# Patient Record
Sex: FEMALE | Race: WHITE | Employment: FULL TIME | URBAN - NONMETROPOLITAN AREA
[De-identification: names, ages, dates, MRNs, and addresses within clinical notes are randomized per-mention and may not be internally consistent; named-entity substitution may affect disease eponyms.]

---

## 2017-10-18 ENCOUNTER — HOSPITAL ENCOUNTER (EMERGENCY)
Age: 42
Discharge: HOME | End: 2017-10-18
Payer: MEDICAID

## 2017-10-18 VITALS
SYSTOLIC BLOOD PRESSURE: 133 MMHG | OXYGEN SATURATION: 94 % | HEART RATE: 95 BPM | DIASTOLIC BLOOD PRESSURE: 82 MMHG | TEMPERATURE: 97.8 F | RESPIRATION RATE: 18 BRPM

## 2017-10-18 DIAGNOSIS — G54.2 CERVICAL NERVE ROOT IMPINGEMENT: Primary | ICD-10-CM

## 2017-10-18 PROCEDURE — 99282 EMERGENCY DEPT VISIT SF MDM: CPT

## 2017-10-18 PROCEDURE — 96372 THER/PROPH/DIAG INJ SC/IM: CPT

## 2017-10-18 PROCEDURE — 6360000002 HC RX W HCPCS: Performed by: STUDENT IN AN ORGANIZED HEALTH CARE EDUCATION/TRAINING PROGRAM

## 2017-10-18 RX ORDER — METHYLPREDNISOLONE 4 MG/1
TABLET ORAL
Qty: 1 KIT | Refills: 0 | Status: SHIPPED | OUTPATIENT
Start: 2017-10-18 | End: 2017-10-24

## 2017-10-18 RX ORDER — KETOROLAC TROMETHAMINE 30 MG/ML
30 INJECTION, SOLUTION INTRAMUSCULAR; INTRAVENOUS ONCE
Status: COMPLETED | OUTPATIENT
Start: 2017-10-18 | End: 2017-10-18

## 2017-10-18 RX ORDER — METHYLPREDNISOLONE SODIUM SUCCINATE 125 MG/2ML
125 INJECTION, POWDER, LYOPHILIZED, FOR SOLUTION INTRAMUSCULAR; INTRAVENOUS ONCE
Status: COMPLETED | OUTPATIENT
Start: 2017-10-18 | End: 2017-10-18

## 2017-10-18 RX ORDER — NAPROXEN 500 MG/1
500 TABLET ORAL 2 TIMES DAILY WITH MEALS
Qty: 30 TABLET | Refills: 0 | Status: SHIPPED | OUTPATIENT
Start: 2017-10-18 | End: 2021-03-10

## 2017-10-18 RX ADMIN — KETOROLAC TROMETHAMINE 30 MG: 30 INJECTION, SOLUTION INTRAMUSCULAR at 22:23

## 2017-10-18 RX ADMIN — METHYLPREDNISOLONE SODIUM SUCCINATE 125 MG: 125 INJECTION, POWDER, FOR SOLUTION INTRAMUSCULAR; INTRAVENOUS at 22:23

## 2017-10-18 ASSESSMENT — PAIN DESCRIPTION - LOCATION: LOCATION: ARM;NECK;HEAD

## 2017-10-18 ASSESSMENT — ENCOUNTER SYMPTOMS
NAUSEA: 0
DIARRHEA: 0
BACK PAIN: 0
ABDOMINAL PAIN: 0
WHEEZING: 0
EYE PAIN: 0
EYE DISCHARGE: 0
SORE THROAT: 0
SHORTNESS OF BREATH: 0
COUGH: 0
VOMITING: 0
RHINORRHEA: 0

## 2017-10-18 ASSESSMENT — PAIN DESCRIPTION - DESCRIPTORS: DESCRIPTORS: THROBBING;HEADACHE

## 2017-10-18 ASSESSMENT — PAIN DESCRIPTION - ORIENTATION: ORIENTATION: RIGHT

## 2017-10-18 ASSESSMENT — PAIN SCALES - GENERAL: PAINLEVEL_OUTOF10: 7

## 2017-10-18 ASSESSMENT — PAIN DESCRIPTION - PAIN TYPE: TYPE: ACUTE PAIN
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
## 2017-10-19 NOTE — ED PROVIDER NOTES
Solumedrol. I observed the patient's condition to remain stable during the duration of their stay. I explained my proposed course of treatment to the patient and their family, and they amenable to my decision. They were discharged home with Medrol (4 mg tablet) and Naproxen (500 mg tablet, instructed to take 2 times daily with meals) , and they will return to the ED if their symptoms become more severe in nature, or otherwise change. CRITICAL CARE:   None     CONSULTS:  None    PROCEDURES:  None    FINAL IMPRESSION      1. Cervical nerve root impingement          DISPOSITION/PLAN   Discharged    PATIENT REFERRED TO:  Neurologist  Please call your neurologist in the AM to request records and inform them of your worsening symptoms          DISCHARGE MEDICATIONS:  Discharge Medication List as of 10/18/2017 11:39 PM      START taking these medications    Details   methylPREDNISolone (MEDROL, ZAKI,) 4 MG tablet Take by mouth., Disp-1 kit, R-0Print      naproxen (NAPROSYN) 500 MG tablet Take 1 tablet by mouth 2 times daily (with meals) for 30 doses, Disp-30 tablet, R-0Print             (Please note that portions of this note were completed with a voice recognition program.  Efforts were made to edit the dictations but occasionally words are mis-transcribed.)    The patient was given an opportunity to see the Emergency Attending. The patient voiced understanding that I was a Mid-Level Provider and was in agreement with being seen independently by myself. Scribe:  Meng Serrano 10/18/17 10:34 PM Scribing for and in the presence of Radha Aaron PA-C. Signed by: Wallace Melara, 10/20/17 9:28 PM    Provider:  I personally performed the services described in the documentation, reviewed and edited the documentation which was dictated to the scribe in my presence, and it accurately records my words and actions.     Radha Aaron PA-C 10/18/17 9:28 PM\      Karlene Cruz PA-C  10/20/17 9893
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
## 2021-03-10 ENCOUNTER — HOSPITAL ENCOUNTER (EMERGENCY)
Age: 46
Discharge: HOME | End: 2021-03-11
Payer: MEDICAID

## 2021-03-10 DIAGNOSIS — F32.A ANXIETY AND DEPRESSION: Primary | ICD-10-CM

## 2021-03-10 DIAGNOSIS — F41.9 ANXIETY AND DEPRESSION: Primary | ICD-10-CM

## 2021-03-10 PROCEDURE — 80307 DRUG TEST PRSMV CHEM ANLYZR: CPT

## 2021-03-10 PROCEDURE — 82077 ASSAY SPEC XCP UR&BREATH IA: CPT

## 2021-03-10 PROCEDURE — 80179 DRUG ASSAY SALICYLATE: CPT

## 2021-03-10 PROCEDURE — 85025 COMPLETE CBC W/AUTO DIFF WBC: CPT

## 2021-03-10 PROCEDURE — 99283 EMERGENCY DEPT VISIT LOW MDM: CPT

## 2021-03-10 PROCEDURE — 36415 COLL VENOUS BLD VENIPUNCTURE: CPT

## 2021-03-10 PROCEDURE — 80048 BASIC METABOLIC PNL TOTAL CA: CPT

## 2021-03-10 PROCEDURE — 81001 URINALYSIS AUTO W/SCOPE: CPT

## 2021-03-10 PROCEDURE — 80143 DRUG ASSAY ACETAMINOPHEN: CPT

## 2021-03-10 RX ORDER — ALBUTEROL SULFATE 0.63 MG/3ML
1 SOLUTION RESPIRATORY (INHALATION) EVERY 6 HOURS PRN
COMMUNITY

## 2021-03-10 RX ORDER — PREDNISONE 20 MG/1
20 TABLET ORAL DAILY
COMMUNITY

## 2021-03-10 RX ORDER — ALBUTEROL SULFATE 90 UG/1
2 AEROSOL, METERED RESPIRATORY (INHALATION) EVERY 6 HOURS PRN
COMMUNITY

## 2021-03-10 ASSESSMENT — SLEEP AND FATIGUE QUESTIONNAIRES
DO YOU USE A SLEEP AID: NO
AVERAGE NUMBER OF SLEEP HOURS: 5

## 2021-03-10 ASSESSMENT — ENCOUNTER SYMPTOMS
COUGH: 0
VOMITING: 0
RHINORRHEA: 0
BACK PAIN: 0
CHEST TIGHTNESS: 0
SHORTNESS OF BREATH: 0
NAUSEA: 0

## 2021-03-10 ASSESSMENT — PATIENT HEALTH QUESTIONNAIRE - PHQ9: SUM OF ALL RESPONSES TO PHQ QUESTIONS 1-9: 13

## 2021-03-10 ASSESSMENT — PAIN DESCRIPTION - PAIN TYPE: TYPE: CHRONIC PAIN

## 2021-03-11 VITALS
HEIGHT: 68 IN | SYSTOLIC BLOOD PRESSURE: 121 MMHG | DIASTOLIC BLOOD PRESSURE: 71 MMHG | WEIGHT: 293 LBS | TEMPERATURE: 98.4 F | HEART RATE: 68 BPM | OXYGEN SATURATION: 96 % | RESPIRATION RATE: 18 BRPM | BODY MASS INDEX: 44.41 KG/M2

## 2021-03-11 LAB
ACETAMINOPHEN LEVEL: < 5 UG/ML (ref 0–20)
AMPHETAMINE+METHAMPHETAMINE URINE SCREEN: NEGATIVE
ANION GAP SERPL CALCULATED.3IONS-SCNC: 9 MEQ/L (ref 8–16)
BACTERIA: ABNORMAL /HPF
BARBITURATE QUANTITATIVE URINE: NEGATIVE
BASOPHILS # BLD: 0.2 %
BASOPHILS ABSOLUTE: 0 THOU/MM3 (ref 0–0.1)
BENZODIAZEPINE QUANTITATIVE URINE: NEGATIVE
BILIRUBIN URINE: NEGATIVE
BLOOD, URINE: NEGATIVE
BUN BLDV-MCNC: 12 MG/DL (ref 7–22)
CALCIUM SERPL-MCNC: 9.2 MG/DL (ref 8.5–10.5)
CANNABINOID QUANTITATIVE URINE: POSITIVE
CASTS 2: ABNORMAL /LPF
CASTS UA: ABNORMAL /LPF
CHARACTER, URINE: ABNORMAL
CHLORIDE BLD-SCNC: 101 MEQ/L (ref 98–111)
CO2: 26 MEQ/L (ref 23–33)
COCAINE METABOLITE QUANTITATIVE URINE: NEGATIVE
COLOR: YELLOW
CREAT SERPL-MCNC: 0.8 MG/DL (ref 0.4–1.2)
CRYSTALS, UA: ABNORMAL
EOSINOPHIL # BLD: 0.4 %
EOSINOPHILS ABSOLUTE: 0.1 THOU/MM3 (ref 0–0.4)
EPITHELIAL CELLS, UA: ABNORMAL /HPF
ERYTHROCYTE [DISTWIDTH] IN BLOOD BY AUTOMATED COUNT: 12.8 % (ref 11.5–14.5)
ERYTHROCYTE [DISTWIDTH] IN BLOOD BY AUTOMATED COUNT: 45.1 FL (ref 35–45)
ETHYL ALCOHOL, SERUM: < 0.01 %
GFR SERPL CREATININE-BSD FRML MDRD: 78 ML/MIN/1.73M2
GLUCOSE BLD-MCNC: 135 MG/DL (ref 70–108)
GLUCOSE URINE: NEGATIVE MG/DL
HCT VFR BLD CALC: 45.4 % (ref 37–47)
HEMOGLOBIN: 15 GM/DL (ref 12–16)
IMMATURE GRANS (ABS): 0.07 THOU/MM3 (ref 0–0.07)
IMMATURE GRANULOCYTES: 0.5 %
KETONES, URINE: ABNORMAL
LEUKOCYTE ESTERASE, URINE: NEGATIVE
LYMPHOCYTES # BLD: 9.4 %
LYMPHOCYTES ABSOLUTE: 1.2 THOU/MM3 (ref 1–4.8)
MCH RBC QN AUTO: 32 PG (ref 26–33)
MCHC RBC AUTO-ENTMCNC: 33 GM/DL (ref 32.2–35.5)
MCV RBC AUTO: 96.8 FL (ref 81–99)
MISCELLANEOUS 2: ABNORMAL
MONOCYTES # BLD: 4.5 %
MONOCYTES ABSOLUTE: 0.6 THOU/MM3 (ref 0.4–1.3)
MUCUS: ABNORMAL
NITRITE, URINE: NEGATIVE
NUCLEATED RED BLOOD CELLS: 0 /100 WBC
OPIATES, URINE: NEGATIVE
OSMOLALITY CALCULATION: 273.7 MOSMOL/KG (ref 275–300)
OXYCODONE: NEGATIVE
PH UA: 5.5 (ref 5–9)
PHENCYCLIDINE QUANTITATIVE URINE: NEGATIVE
PLATELET # BLD: 424 THOU/MM3 (ref 130–400)
PMV BLD AUTO: 9.8 FL (ref 9.4–12.4)
POTASSIUM SERPL-SCNC: 4.5 MEQ/L (ref 3.5–5.2)
PROTEIN UA: NEGATIVE
RBC # BLD: 4.69 MILL/MM3 (ref 4.2–5.4)
RBC URINE: ABNORMAL /HPF
RENAL EPITHELIAL, UA: ABNORMAL
SALICYLATE, SERUM: < 0.3 MG/DL (ref 2–10)
SEG NEUTROPHILS: 85 %
SEGMENTED NEUTROPHILS ABSOLUTE COUNT: 11 THOU/MM3 (ref 1.8–7.7)
SODIUM BLD-SCNC: 136 MEQ/L (ref 135–145)
SPECIFIC GRAVITY, URINE: > 1.03 (ref 1–1.03)
UROBILINOGEN, URINE: 1 EU/DL (ref 0–1)
WBC # BLD: 12.9 THOU/MM3 (ref 4.8–10.8)
WBC UA: ABNORMAL /HPF
YEAST: ABNORMAL

## 2021-03-11 RX ORDER — NICOTINE 21 MG/24HR
1 PATCH, TRANSDERMAL 24 HOURS TRANSDERMAL DAILY
Status: DISCONTINUED | OUTPATIENT
Start: 2021-03-11 | End: 2021-03-11

## 2021-03-11 RX ORDER — NICOTINE 21 MG/24HR
PATCH, TRANSDERMAL 24 HOURS TRANSDERMAL
Status: DISCONTINUED
Start: 2021-03-11 | End: 2021-03-11 | Stop reason: HOSPADM

## 2021-03-11 RX ORDER — NICOTINE 21 MG/24HR
1 PATCH, TRANSDERMAL 24 HOURS TRANSDERMAL ONCE
Status: DISCONTINUED | OUTPATIENT
Start: 2021-03-11 | End: 2021-03-11 | Stop reason: HOSPADM

## 2021-03-11 NOTE — ED NOTES
Social work at bedside to offer pt resources at this time     Shin Melchor, [address] [facility]  03/11/21 0146

## 2021-03-11 NOTE — ED PROVIDER NOTES
[facility] Emergency Department    CHIEF COMPLAINT       Chief Complaint   Patient presents with    Depression    Mental Health Problem       Nurses Notes reviewed and I agree except as noted in the HPI. HISTORY OF PRESENT ILLNESS    Angelica Domingo loan 40 y.o. female who presents to the ED for evaluation of depression and suicidal thoughts. She won't act on it because of family. She states that her doctor sent her here to be admitted. She states she has been depressed and overwhelmed. She is not getting out of bed. Is not functioning at all. She has not been on any medication for depression. She states she has severe anxiety. HPI was provided by the patient    REVIEW OF SYSTEMS     Review of Systems   Constitutional: Negative for chills, fatigue and fever. HENT: Negative for congestion, ear discharge, ear pain, postnasal drip and rhinorrhea. Respiratory: Negative for cough, chest tightness and shortness of breath. Cardiovascular: Negative for chest pain. Gastrointestinal: Negative for nausea and vomiting. Genitourinary: Negative for difficulty urinating, dysuria, enuresis, flank pain and hematuria. Musculoskeletal: Negative for back pain and joint swelling. Skin: Negative for rash. Neurological: Negative for dizziness, light-headedness, numbness and headaches. Psychiatric/Behavioral: Positive for dysphoric mood, sleep disturbance and suicidal ideas (with no plan/intent). Negative for agitation, behavioral problems and confusion. The patient is nervous/anxious. All other systems negative except as noted. PAST MEDICAL HISTORY     Past Medical History:   Diagnosis Date    Asthma     Bulging of cervical intervertebral disc     c3-c6       SURGICALHISTORY      has a past surgical history that includes Foot surgery (Right).     CURRENT MEDICATIONS       Discharge Medication List as of 3/11/2021  3:25 AM      CONTINUE these medications which have NOT CHANGED Details   predniSONE (DELTASONE) 20 MG tablet Take 20 mg by mouth dailyHistorical Med      albuterol (ACCUNEB) 0.63 MG/3ML nebulizer solution Take 1 ampule by nebulization every 6 hours as needed for WheezingHistorical Med      fluticasone-salmeterol (ADVAIR) 250-50 MCG/DOSE AEPB Inhale 1 puff into the lungs every 12 hoursHistorical Med      albuterol sulfate HFA (VENTOLIN HFA) 108 (90 Base) MCG/ACT inhaler Inhale 2 puffs into the lungs every 6 hours as needed for [address]     has No Known Allergies. FAMILY HISTORY     has no family status information on file. family history is not on file.     SOCIAL HISTORY       Social History     Socioeconomic History    Marital status:      Spouse name: Not on file    Number of children: Not on file    Years of education: Not on file    Highest education level: Not on file   Occupational History    Not on file   Social Needs    Financial resource strain: Not on file    Food insecurity     Worry: Not on file     Inability: Not on file    Transportation needs     Medical: Not on file     Non-medical: Not on file   Tobacco Use    Smoking status: Current Every Day Smoker     Packs/day: 0.50     Types: Cigarettes    Smokeless tobacco: Never Used   Substance and Sexual Activity    Alcohol use: Yes     Comment: rarely    Drug use: Not on file    Sexual activity: Not on file   Lifestyle    Physical activity     Days per week: Not on file     Minutes per session: Not on file    Stress: Not on file   Relationships    Social connections     Talks on phone: Not on file     Gets together: Not on file     Attends Oriental orthodox service: Not on file     Active member of club or organization: Not on file     Attends meetings of clubs or organizations: Not on file     Relationship status: Not on file    Intimate partner violence     Fear of current or ex partner: Not on file     Emotionally abused: Not on file     Physically abused: Not on file     Forced sexual activity: Not on file   Other Topics Concern    Not on file   Social History Narrative    Not on file       PHYSICAL EXAM     INITIAL VITALS:  height is 5' 8\" (1.727 m) and weight is 319 lb (144.7 kg) (abnormal). Her oral temperature is 98.4 °F (36.9 °C). Her blood pressure is 121/71 and her pulse is 68. Her respiration is 18 and oxygen saturation is 96%. Physical Exam  Vitals signs and nursing note reviewed. Constitutional:       General: She is not in acute distress. Appearance: She is well-developed. She is not diaphoretic. HENT:      Head: Normocephalic and atraumatic. Mouth/Throat:      Mouth: Mucous membranes are moist.      Pharynx: Oropharynx is clear. Eyes:      General:         Right eye: No discharge. Left eye: No discharge. Conjunctiva/sclera: Conjunctivae normal.   Neck:      Musculoskeletal: Normal range of motion. Trachea: No tracheal deviation. Cardiovascular:      Rate and Rhythm: Normal rate and regular rhythm. Heart sounds: Normal heart sounds. No murmur. No gallop. Comments: Normal capillary refill  Pulmonary:      Effort: Pulmonary effort is normal. No respiratory distress. Breath sounds: Normal breath sounds. No stridor. Abdominal:      General: Bowel sounds are normal.      Palpations: Abdomen is soft. Musculoskeletal: Normal range of motion. General: No tenderness or deformity. Skin:     General: Skin is warm and dry. Capillary Refill: Capillary refill takes less than 2 seconds. Coloration: Skin is not pale. Findings: No erythema or rash. Neurological:      General: No focal deficit present. Mental Status: She is alert and oriented to person, place, and time. Cranial Nerves: No cranial nerve deficit. Psychiatric:         Mood and Affect: Mood is anxious and depressed. Affect is flat. Behavior: Behavior is withdrawn. Thought Content:  Thought content 98.4 °F (36.9 °C)    TempSrc: Oral    SpO2: 97% 96%   Weight: (!) 319 lb (144.7 kg)    Height: 5' 8\" (1.727 m)           [facility]                  ED Course as of Mar 11 0459   Thu Mar 11, 2021   0323 Nurse notified me patient is accepted at [address] [place] [address]      ED Course User Index  [KJ] Kacy Stone, APRN - CNP         [facility]    The patient was seen and evaluated within the ED today for the evaluation of depression and anxiety. Physical exam revealed no significant abnormalities or concerns. I completed a medical evaluation of the patient and ordered appropriate labs which were unremarkable. JR and social work completed a full psychiatric evaluation of the patient and determined that he met discharge criteria. I medically cleared the patient. JR and social work's noted should be consulted for the psychiatric evaluation and reason for discharge. The patient was however, accepted at [place] [address]. Patient was transported there by cab for mental health treatment. Medications   nicotine (NICODERM CQ) 21 MG/24HR 1 patch (has no administration in time range)         Patient was seen independently by myself. The patient's final impression and disposition and plan was determined by myself. Strict return precautions and follow up instructions were discussed with the patient prior to discharge, with which the patient agrees. Physical assessment findings, diagnostic testing(s) if applicable, and vital signs reviewed with patient/patient representative. Questions answered. Medications asdirected, including OTC medications for supportive care. Education provided on medications. Differential diagnosis(s) discussed with patient/patient representative. Home care/self care instructions reviewed withpatient/patient representative. Patient is to follow-up with family care provider in 2-3 days if no improvement. Patient is to go to the emergency department if symptoms worsen.   Patient/patient representative isaware of care plan, questions answered, verbalizes understanding and is in agreement. CRITICAL CARE:   None    CONSULTS:  JR    PROCEDURES:  None    FINAL IMPRESSION     1. Anxiety and depression          DISPOSITION/PLAN   DISPOSITION Decision To Discharge 03/11/2021 03:51:31 AM      PATIENT REFERREDTO:  [facility]  [place], [place]  [phone]    Follow up with your current provider within two days. Return to any emergency room or call 911 if symptoms worsen. [facility]  [address] [facility] [address]  [phone]    Go to   For follow up      DISCHARGE MEDICATIONS:  Discharge Medication List as of 3/11/2021  3:25 AM          (Please note that portions of this note were completed with a voice recognition program.  Efforts were made to edit the dictations but occasionally words are mis-transcribed.)         SHAKILA Castañeda - SHAKILA Carson CNP  03/11/21 1839

## 2021-03-11 NOTE — PROGRESS NOTES
Patient reports concern with discharge and request other options. Jose Elias Barth CNP in agreement with referral to CSU. Information is provided to CSU  02:40 Patient is accepted to the [facility] 935.

## 2021-03-11 NOTE — ED NOTES
Pt resting quietly in room. VSS, respirations even and unlabored. Pt given oral hydration. Side rails up x2 with call light in reach. Will continue to monitor.        Spike Nunn RN  03/11/21 0637

## 2021-03-11 NOTE — ED TRIAGE NOTES
Pt presents to the ED via ED lobby with c/o depression. Pt arrives voluntarily after being instructed by her psychiatrist to come for evaluation and admission. Pt states her depression has progressively worsened to the point where she has a difficult time managing simple task in her life and doesn't want to get out of bed in the morning. Pt states she also struggles with ADD. Pt reports thoughts of wishing she could go to sleep and not wake up. Pt denies plan or intent on acting on these thoughts. Pt has family support that prevents her from wanting to act on her thoughts. VSS, respirations labored with ambulation. Pt tearful on arrival. Pt explained POC. Level A paged. Urine sample collected.

## 2021-03-11 NOTE — SUICIDE SAFETY PLAN
SAFETY PLAN    A suicide Safety Plan is a document that supports someone when they are having thoughts of suicide. Warning Signs that indicate a suicidal crisis may be developing: What (situations, thoughts, feelings, body sensations, behaviors, etc.) do you experience that lets you know you are beginning to think about suicide? 1. Crying, Feeling hopeless, feel like I can't get  Help,   2.   3. Internal Coping Strategies:  What things can I do (relaxation techniques, hobbies, physical activities, etc.) to take my mind off my problems without contacting another person? 1. 'I don't know that's why I came here;  2.  3.     People and social settings that provide distraction: Who can I call or where can I go to distract me? 1. Name:  Nothing/nobody. Phone:   3. Place:           4. Place:    People whom I can ask for help: Who can I call when I need help - for example, friends, family, clergy, someone else? 1. Name:    Brother           Phone:  2. Name:  Phone:   3. Name:  Phone:     Professionals or [address] [place] [address] I can contact during a crisis: Who can I call for help - for example, my doctor, my psychiatrist, my psychologist, a mental health provider, a suicide hotline? 1. Clinician Name:    Phone:       Clinician Pager or Emergency Contact #:     2. Clinician Name:  Phone:       Clinician Pager or Emergency Contact #:   3. Suicide Prevention Lifeline: 4-434-838-TALK (6294)    4. [address] [place] [address] Emergency Services -  for example, [facility] suicide hotline, [facility] Hotline:       Emergency Services Address:       Emergency Services Phone:     Making the environment safe: How can I make my environment (house/apartment/living space) safer? For example, can I remove guns, medications, and other items?   1.   2. 'Already is I guess'

## 2021-07-19 ENCOUNTER — NURSE TRIAGE (OUTPATIENT)
Dept: OTHER | Facility: CLINIC | Age: 46
End: 2021-07-19

## 2021-07-20 NOTE — TELEPHONE ENCOUNTER
Brief description of triage: a can fell on foot at [facility] about an hour ago, 2 previous surgeries on same foot, reports can bear weight however foot is painful with bruising and swelling and loss of sensation to the top of the great toe.  6/10 with no breaks in skin    Triage indicates for patient to Go to ED Now due to loss of sensation to toe. Care advice provided, patient verbalizes understanding; denies any other questions or concerns; instructed to call back for any new or worsening symptoms. This triage is a result of a call to Shayna alvarez Nurse. Please do not respond to the triage nurse through this encounter. Any subsequent communication should be directly with the patient. Reason for Disposition   [1] Numbness (new loss of sensation) of toe(s) AND [2] present now    Answer Assessment - Initial Assessment Questions  1. MECHANISM: \"How did the injury happen? \" (e.g., twisting injury, direct blow)       A can fell onto foot    2. ONSET: \"When did the injury happen? \" (Minutes or hours ago)       1 hour ago    3. LOCATION: \"Where is the injury located? \"       Right foot    4. APPEARANCE of INJURY: \"What does the injury look like? \"       Bruising and swelling    5. WEIGHT-BEARING: \"Can you put weight on that foot? \" \"Can you walk (four steps or more)? \"        Can walk but painful    6. SIZE: For cuts, bruises, or swelling, ask: \"How large is it? \" (e.g., inches or centimeters;  entire joint)       2in and puffed up    7. PAIN: \"Is there pain? \" If so, ask: \"How bad is the pain? \"    (e.g., Scale 1-10; or mild, moderate, severe)      6/10    8. TETANUS: For any breaks in the skin, ask: \"When was the last tetanus booster? \"      No    9. OTHER SYMPTOMS: \"Do you have any other symptoms? \"       No    10. PREGNANCY: \"Is there any chance you are pregnant? \" \"When was your last menstrual period? \"        N/A    Protocols used: ANKLE AND FOOT INJURY-ADULT-

## 2023-08-25 ENCOUNTER — NURSE TRIAGE (OUTPATIENT)
Dept: OTHER | Facility: CLINIC | Age: 48
End: 2023-08-25

## 2023-08-26 NOTE — TELEPHONE ENCOUNTER
Location of patient: [place]    Subjective: [place] [facility] [place] infection and kidney stone, pain has moved to other side and now with abd swelling and bloating, feels like may have passed suspected kidney stone, abd cramping on pain medication, reports nausea, no vomiting    Current Symptoms: abd cramping and bloat    Onset: a few hours ago; gradual    Associated Symptoms: NA    Pain Severity: unable to rate on pain scale,  cramping,  waxing and waning    Temperature: \"feels a little warrm\" subjective     What has been tried: abx from UTI diagnosis    Recommended disposition: See HCP within 4 Hours (or PCP triage)    Care advice provided, patient verbalizes understanding; denies any other questions or concerns; instructed to call back for any new or worsening symptoms. Patient/caller agrees to proceed to the Emergency Department    This triage is a result of a call to Shayna alvarez Nurse. Please do not respond to the triage nurse through this encounter. Any subsequent communication should be directly with the patient.     Reason for Disposition   [1] MODERATE-SEVERE SWELLING of abdomen (e.g., looks very distended or swollen) AND [2] NEW-onset or much worse    Protocols used: Abdomen Bloating and Swelling-ADULT- No

## 2024-03-25 RX ORDER — TRAMADOL HYDROCHLORIDE 50 MG/1
50 TABLET ORAL EVERY 6 HOURS PRN
Status: ON HOLD | COMMUNITY
End: 2024-03-28 | Stop reason: HOSPADM

## 2024-03-25 RX ORDER — EPINEPHRINE 0.3 MG/.3ML
0.3 INJECTION SUBCUTANEOUS PRN
COMMUNITY

## 2024-03-25 RX ORDER — BUDESONIDE AND FORMOTEROL FUMARATE DIHYDRATE 160; 4.5 UG/1; UG/1
2 AEROSOL RESPIRATORY (INHALATION) 2 TIMES DAILY
COMMUNITY

## 2024-03-25 RX ORDER — ARIPIPRAZOLE 10 MG/1
10 TABLET ORAL DAILY
COMMUNITY

## 2024-03-25 RX ORDER — ATORVASTATIN CALCIUM 40 MG/1
40 TABLET, FILM COATED ORAL DAILY
COMMUNITY

## 2024-03-25 RX ORDER — DULOXETIN HYDROCHLORIDE 60 MG/1
60 CAPSULE, DELAYED RELEASE ORAL DAILY
COMMUNITY

## 2024-03-25 RX ORDER — PANTOPRAZOLE SODIUM 40 MG/1
40 TABLET, DELAYED RELEASE ORAL DAILY
COMMUNITY

## 2024-03-25 NOTE — PROGRESS NOTES
cancelled if you don't have this)  Report to [facility] on 2nd floor  If you would become ill prior to surgery, please call the surgeon  May have a visitor with you, we request that you limit to 2 visitors in pre-op area  Masks are recommended but not required, new masks at entrance desk  Call [phone] for any questions

## 2024-03-25 NOTE — PROGRESS NOTES
Angelica called in to cancel her PAT Visit Appointment due to a migraine today. Surgery is this wed- in 2 days. We made an agreement for me to call her this afternoon to go over medications and instructions.

## 2024-03-25 NOTE — PROGRESS NOTES
Angelica stated she \"stopped all my medicatons before surgery\" .  I instructed patient to continue her symbicort Inhaler twice a day as usual and take it am of surgery. I instructed her to continue her cymbalta, abilify and protonix and she may take them  with sips of water am of surgery. And she also may take her tramadol as needed before surgery.  I instructed Angelica to call her  If this Migraine does not get better by tomorrow.

## 2024-03-26 ENCOUNTER — ANESTHESIA EVENT (OUTPATIENT)
Dept: OPERATING ROOM | Age: 49
End: 2024-03-26
Payer: COMMERCIAL

## 2024-03-27 ENCOUNTER — HOSPITAL ENCOUNTER (OUTPATIENT)
Age: 49
Discharge: HOME HEALTH | End: 2024-03-28
Attending: ORTHOPAEDIC SURGERY | Admitting: ORTHOPAEDIC SURGERY
Payer: COMMERCIAL

## 2024-03-27 ENCOUNTER — APPOINTMENT (OUTPATIENT)
Dept: GENERAL RADIOLOGY | Age: 49
End: 2024-03-27
Attending: ORTHOPAEDIC SURGERY
Payer: COMMERCIAL

## 2024-03-27 ENCOUNTER — APPOINTMENT (OUTPATIENT)
Dept: CT IMAGING | Age: 49
End: 2024-03-27
Attending: ORTHOPAEDIC SURGERY
Payer: COMMERCIAL

## 2024-03-27 ENCOUNTER — ANESTHESIA (OUTPATIENT)
Dept: OPERATING ROOM | Age: 49
End: 2024-03-27
Payer: COMMERCIAL

## 2024-03-27 DIAGNOSIS — Z98.1 S/P CERVICAL SPINAL FUSION: Primary | ICD-10-CM

## 2024-03-27 PROBLEM — M48.02 SPINAL STENOSIS OF CERVICAL REGION WITH RADICULOPATHY: Status: ACTIVE | Noted: 2024-03-27

## 2024-03-27 PROBLEM — M54.12 SPINAL STENOSIS OF CERVICAL REGION WITH RADICULOPATHY: Status: ACTIVE | Noted: 2024-03-27

## 2024-03-27 LAB
ABO: NORMAL
ANTIBODY SCREEN: NORMAL
PREGNANCY, URINE: NEGATIVE
RH FACTOR: NORMAL

## 2024-03-27 PROCEDURE — 72020 X-RAY EXAM OF SPINE 1 VIEW: CPT

## 2024-03-27 PROCEDURE — 6370000000 HC RX 637 (ALT 250 FOR IP): Performed by: PHYSICIAN ASSISTANT

## 2024-03-27 PROCEDURE — 6360000002 HC RX W HCPCS: Performed by: ANESTHESIOLOGY

## 2024-03-27 PROCEDURE — 94640 AIRWAY INHALATION TREATMENT: CPT

## 2024-03-27 PROCEDURE — 6360000002 HC RX W HCPCS: Performed by: PHYSICIAN ASSISTANT

## 2024-03-27 PROCEDURE — 2580000003 HC RX 258: Performed by: PHYSICIAN ASSISTANT

## 2024-03-27 PROCEDURE — 72125 CT NECK SPINE W/O DYE: CPT

## 2024-03-27 PROCEDURE — 2709999900 HC NON-CHARGEABLE SUPPLY: Performed by: ORTHOPAEDIC SURGERY

## 2024-03-27 PROCEDURE — 2500000003 HC RX 250 WO HCPCS: Performed by: ANESTHESIOLOGY

## 2024-03-27 PROCEDURE — 86900 BLOOD TYPING SEROLOGIC ABO: CPT

## 2024-03-27 PROCEDURE — 7100000000 HC PACU RECOVERY - FIRST 15 MIN: Performed by: ORTHOPAEDIC SURGERY

## 2024-03-27 PROCEDURE — 2580000003 HC RX 258: Performed by: ANESTHESIOLOGY

## 2024-03-27 PROCEDURE — 7100000010 HC PHASE II RECOVERY - FIRST 15 MIN: Performed by: ORTHOPAEDIC SURGERY

## 2024-03-27 PROCEDURE — 86850 RBC ANTIBODY SCREEN: CPT

## 2024-03-27 PROCEDURE — 7100000011 HC PHASE II RECOVERY - ADDTL 15 MIN: Performed by: ORTHOPAEDIC SURGERY

## 2024-03-27 PROCEDURE — 3600000004 HC SURGERY LEVEL 4 BASE: Performed by: ORTHOPAEDIC SURGERY

## 2024-03-27 PROCEDURE — 2720000010 HC SURG SUPPLY STERILE: Performed by: ORTHOPAEDIC SURGERY

## 2024-03-27 PROCEDURE — L0120 CERV FLEX N/ADJ FOAM PRE OTS: HCPCS | Performed by: ORTHOPAEDIC SURGERY

## 2024-03-27 PROCEDURE — 36415 COLL VENOUS BLD VENIPUNCTURE: CPT

## 2024-03-27 PROCEDURE — 7100000001 HC PACU RECOVERY - ADDTL 15 MIN: Performed by: ORTHOPAEDIC SURGERY

## 2024-03-27 PROCEDURE — 2500000003 HC RX 250 WO HCPCS: Performed by: ORTHOPAEDIC SURGERY

## 2024-03-27 PROCEDURE — C1889 IMPLANT/INSERT DEVICE, NOC: HCPCS | Performed by: ORTHOPAEDIC SURGERY

## 2024-03-27 PROCEDURE — 3700000001 HC ADD 15 MINUTES (ANESTHESIA): Performed by: ORTHOPAEDIC SURGERY

## 2024-03-27 PROCEDURE — 6360000002 HC RX W HCPCS

## 2024-03-27 PROCEDURE — C1713 ANCHOR/SCREW BN/BN,TIS/BN: HCPCS | Performed by: ORTHOPAEDIC SURGERY

## 2024-03-27 PROCEDURE — 3700000000 HC ANESTHESIA ATTENDED CARE: Performed by: ORTHOPAEDIC SURGERY

## 2024-03-27 PROCEDURE — 3600000014 HC SURGERY LEVEL 4 ADDTL 15MIN: Performed by: ORTHOPAEDIC SURGERY

## 2024-03-27 PROCEDURE — 81025 URINE PREGNANCY TEST: CPT

## 2024-03-27 PROCEDURE — 86901 BLOOD TYPING SEROLOGIC RH(D): CPT

## 2024-03-27 DEVICE — 4.0MM VARIABLE ANGLE SCREW, SELF-TAPPING, 14MM
Type: IMPLANTABLE DEVICE | Site: NECK | Status: FUNCTIONAL
Brand: ADMIRAL

## 2024-03-27 DEVICE — CERVICAL PLATE, 2 LEVEL, 32MM
Type: IMPLANTABLE DEVICE | Site: NECK | Status: FUNCTIONAL
Brand: ADMIRAL

## 2024-03-27 DEVICE — INTERBODY, 14X13X7MM, 7 DEGREE, 3D
Type: IMPLANTABLE DEVICE | Site: NECK | Status: FUNCTIONAL
Brand: 3D PRINTED INTERBODY SYSTEMS

## 2024-03-27 DEVICE — GRAFT BNE 2 CC DBM FIBREX: Type: IMPLANTABLE DEVICE | Site: NECK | Status: FUNCTIONAL

## 2024-03-27 DEVICE — GRAFT BNE MED: Type: IMPLANTABLE DEVICE | Site: NECK | Status: FUNCTIONAL

## 2024-03-27 RX ORDER — FENTANYL CITRATE 50 UG/ML
50 INJECTION, SOLUTION INTRAMUSCULAR; INTRAVENOUS EVERY 5 MIN PRN
Status: COMPLETED | OUTPATIENT
Start: 2024-03-27 | End: 2024-03-27

## 2024-03-27 RX ORDER — SODIUM CHLORIDE 0.9 % (FLUSH) 0.9 %
5-40 SYRINGE (ML) INJECTION EVERY 12 HOURS SCHEDULED
Status: DISCONTINUED | OUTPATIENT
Start: 2024-03-27 | End: 2024-03-27 | Stop reason: HOSPADM

## 2024-03-27 RX ORDER — FENTANYL CITRATE 50 UG/ML
INJECTION, SOLUTION INTRAMUSCULAR; INTRAVENOUS PRN
Status: DISCONTINUED | OUTPATIENT
Start: 2024-03-27 | End: 2024-03-27 | Stop reason: SDUPTHER

## 2024-03-27 RX ORDER — CYCLOBENZAPRINE HCL 10 MG
10 TABLET ORAL 3 TIMES DAILY PRN
Status: DISCONTINUED | OUTPATIENT
Start: 2024-03-27 | End: 2024-03-28 | Stop reason: HOSPADM

## 2024-03-27 RX ORDER — HYDROMORPHONE HYDROCHLORIDE 2 MG/ML
INJECTION, SOLUTION INTRAMUSCULAR; INTRAVENOUS; SUBCUTANEOUS PRN
Status: DISCONTINUED | OUTPATIENT
Start: 2024-03-27 | End: 2024-03-27 | Stop reason: SDUPTHER

## 2024-03-27 RX ORDER — ALBUTEROL SULFATE 90 UG/1
2 AEROSOL, METERED RESPIRATORY (INHALATION) EVERY 6 HOURS PRN
Status: DISCONTINUED | OUTPATIENT
Start: 2024-03-27 | End: 2024-03-28 | Stop reason: HOSPADM

## 2024-03-27 RX ORDER — HYDROXYZINE HYDROCHLORIDE 25 MG/1
25 TABLET, FILM COATED ORAL 3 TIMES DAILY PRN
COMMUNITY

## 2024-03-27 RX ORDER — IPRATROPIUM BROMIDE AND ALBUTEROL SULFATE 2.5; .5 MG/3ML; MG/3ML
SOLUTION RESPIRATORY (INHALATION)
Status: DISCONTINUED
Start: 2024-03-27 | End: 2024-03-27 | Stop reason: WASHOUT

## 2024-03-27 RX ORDER — ATORVASTATIN CALCIUM 40 MG/1
40 TABLET, FILM COATED ORAL DAILY
Status: DISCONTINUED | OUTPATIENT
Start: 2024-03-28 | End: 2024-03-28 | Stop reason: HOSPADM

## 2024-03-27 RX ORDER — EPHEDRINE SULFATE/0.9% NACL/PF 25 MG/5 ML
SYRINGE (ML) INTRAVENOUS PRN
Status: DISCONTINUED | OUTPATIENT
Start: 2024-03-27 | End: 2024-03-27 | Stop reason: SDUPTHER

## 2024-03-27 RX ORDER — POLYETHYLENE GLYCOL 3350 17 G/17G
17 POWDER, FOR SOLUTION ORAL DAILY
Status: DISCONTINUED | OUTPATIENT
Start: 2024-03-27 | End: 2024-03-28 | Stop reason: HOSPADM

## 2024-03-27 RX ORDER — SODIUM CHLORIDE 9 MG/ML
INJECTION, SOLUTION INTRAVENOUS CONTINUOUS PRN
Status: DISCONTINUED | OUTPATIENT
Start: 2024-03-27 | End: 2024-03-27 | Stop reason: SDUPTHER

## 2024-03-27 RX ORDER — PROPOFOL 10 MG/ML
INJECTION, EMULSION INTRAVENOUS PRN
Status: DISCONTINUED | OUTPATIENT
Start: 2024-03-27 | End: 2024-03-27 | Stop reason: SDUPTHER

## 2024-03-27 RX ORDER — DEXAMETHASONE SODIUM PHOSPHATE 4 MG/ML
8 INJECTION, SOLUTION INTRA-ARTICULAR; INTRALESIONAL; INTRAMUSCULAR; INTRAVENOUS; SOFT TISSUE EVERY 8 HOURS
Status: COMPLETED | OUTPATIENT
Start: 2024-03-27 | End: 2024-03-28

## 2024-03-27 RX ORDER — ONDANSETRON 2 MG/ML
INJECTION INTRAMUSCULAR; INTRAVENOUS PRN
Status: DISCONTINUED | OUTPATIENT
Start: 2024-03-27 | End: 2024-03-27 | Stop reason: SDUPTHER

## 2024-03-27 RX ORDER — MEPERIDINE HYDROCHLORIDE 25 MG/ML
12.5 INJECTION INTRAMUSCULAR; INTRAVENOUS; SUBCUTANEOUS EVERY 4 HOURS PRN
Status: DISCONTINUED | OUTPATIENT
Start: 2024-03-27 | End: 2024-03-27 | Stop reason: HOSPADM

## 2024-03-27 RX ORDER — BISACODYL 5 MG/1
5 TABLET, DELAYED RELEASE ORAL DAILY
Status: DISCONTINUED | OUTPATIENT
Start: 2024-03-27 | End: 2024-03-28 | Stop reason: HOSPADM

## 2024-03-27 RX ORDER — OXYCODONE HYDROCHLORIDE AND ACETAMINOPHEN 5; 325 MG/1; MG/1
1 TABLET ORAL EVERY 4 HOURS PRN
Status: DISCONTINUED | OUTPATIENT
Start: 2024-03-27 | End: 2024-03-28 | Stop reason: HOSPADM

## 2024-03-27 RX ORDER — DIPHENHYDRAMINE HYDROCHLORIDE 50 MG/ML
12.5 INJECTION INTRAMUSCULAR; INTRAVENOUS
Status: DISCONTINUED | OUTPATIENT
Start: 2024-03-27 | End: 2024-03-27 | Stop reason: HOSPADM

## 2024-03-27 RX ORDER — OXYCODONE HYDROCHLORIDE AND ACETAMINOPHEN 5; 325 MG/1; MG/1
2 TABLET ORAL EVERY 4 HOURS PRN
Status: DISCONTINUED | OUTPATIENT
Start: 2024-03-27 | End: 2024-03-28 | Stop reason: HOSPADM

## 2024-03-27 RX ORDER — SODIUM CHLORIDE 0.9 % (FLUSH) 0.9 %
5-40 SYRINGE (ML) INJECTION EVERY 12 HOURS SCHEDULED
Status: DISCONTINUED | OUTPATIENT
Start: 2024-03-27 | End: 2024-03-28 | Stop reason: HOSPADM

## 2024-03-27 RX ORDER — ARIPIPRAZOLE 10 MG/1
10 TABLET ORAL DAILY
Status: DISCONTINUED | OUTPATIENT
Start: 2024-03-27 | End: 2024-03-28 | Stop reason: HOSPADM

## 2024-03-27 RX ORDER — SODIUM CHLORIDE 0.9 % (FLUSH) 0.9 %
5-40 SYRINGE (ML) INJECTION PRN
Status: DISCONTINUED | OUTPATIENT
Start: 2024-03-27 | End: 2024-03-27 | Stop reason: HOSPADM

## 2024-03-27 RX ORDER — ONDANSETRON 2 MG/ML
4 INJECTION INTRAMUSCULAR; INTRAVENOUS EVERY 6 HOURS PRN
Status: DISCONTINUED | OUTPATIENT
Start: 2024-03-27 | End: 2024-03-28 | Stop reason: HOSPADM

## 2024-03-27 RX ORDER — ONDANSETRON 4 MG/1
4 TABLET, ORALLY DISINTEGRATING ORAL EVERY 8 HOURS PRN
Status: DISCONTINUED | OUTPATIENT
Start: 2024-03-27 | End: 2024-03-28 | Stop reason: HOSPADM

## 2024-03-27 RX ORDER — SODIUM CHLORIDE 9 MG/ML
INJECTION, SOLUTION INTRAVENOUS PRN
Status: DISCONTINUED | OUTPATIENT
Start: 2024-03-27 | End: 2024-03-28 | Stop reason: HOSPADM

## 2024-03-27 RX ORDER — METOCLOPRAMIDE HYDROCHLORIDE 5 MG/ML
10 INJECTION INTRAMUSCULAR; INTRAVENOUS
Status: DISCONTINUED | OUTPATIENT
Start: 2024-03-27 | End: 2024-03-27 | Stop reason: HOSPADM

## 2024-03-27 RX ORDER — NALOXONE HYDROCHLORIDE 0.4 MG/ML
INJECTION, SOLUTION INTRAMUSCULAR; INTRAVENOUS; SUBCUTANEOUS PRN
Status: DISCONTINUED | OUTPATIENT
Start: 2024-03-27 | End: 2024-03-27 | Stop reason: HOSPADM

## 2024-03-27 RX ORDER — DEXAMETHASONE SODIUM PHOSPHATE 10 MG/ML
INJECTION, EMULSION INTRAMUSCULAR; INTRAVENOUS PRN
Status: DISCONTINUED | OUTPATIENT
Start: 2024-03-27 | End: 2024-03-27 | Stop reason: SDUPTHER

## 2024-03-27 RX ORDER — FENTANYL CITRATE 50 UG/ML
25 INJECTION, SOLUTION INTRAMUSCULAR; INTRAVENOUS EVERY 5 MIN PRN
Status: DISCONTINUED | OUTPATIENT
Start: 2024-03-27 | End: 2024-03-27 | Stop reason: HOSPADM

## 2024-03-27 RX ORDER — FENTANYL CITRATE 50 UG/ML
INJECTION, SOLUTION INTRAMUSCULAR; INTRAVENOUS
Status: COMPLETED
Start: 2024-03-27 | End: 2024-03-27

## 2024-03-27 RX ORDER — SODIUM CHLORIDE 0.9 % (FLUSH) 0.9 %
5-40 SYRINGE (ML) INJECTION PRN
Status: DISCONTINUED | OUTPATIENT
Start: 2024-03-27 | End: 2024-03-28 | Stop reason: HOSPADM

## 2024-03-27 RX ORDER — SODIUM CHLORIDE 9 MG/ML
INJECTION, SOLUTION INTRAVENOUS CONTINUOUS
Status: DISCONTINUED | OUTPATIENT
Start: 2024-03-27 | End: 2024-03-28 | Stop reason: HOSPADM

## 2024-03-27 RX ORDER — SODIUM CHLORIDE 9 MG/ML
INJECTION, SOLUTION INTRAVENOUS CONTINUOUS
Status: DISCONTINUED | OUTPATIENT
Start: 2024-03-27 | End: 2024-03-27 | Stop reason: HOSPADM

## 2024-03-27 RX ORDER — LIDOCAINE HYDROCHLORIDE AND EPINEPHRINE 10; 10 MG/ML; UG/ML
INJECTION, SOLUTION INFILTRATION; PERINEURAL PRN
Status: DISCONTINUED | OUTPATIENT
Start: 2024-03-27 | End: 2024-03-27 | Stop reason: ALTCHOICE

## 2024-03-27 RX ORDER — ROCURONIUM BROMIDE 10 MG/ML
INJECTION, SOLUTION INTRAVENOUS PRN
Status: DISCONTINUED | OUTPATIENT
Start: 2024-03-27 | End: 2024-03-27 | Stop reason: SDUPTHER

## 2024-03-27 RX ORDER — DULOXETIN HYDROCHLORIDE 60 MG/1
60 CAPSULE, DELAYED RELEASE ORAL DAILY
Status: DISCONTINUED | OUTPATIENT
Start: 2024-03-27 | End: 2024-03-28 | Stop reason: HOSPADM

## 2024-03-27 RX ORDER — MIDAZOLAM HYDROCHLORIDE 1 MG/ML
INJECTION INTRAMUSCULAR; INTRAVENOUS PRN
Status: DISCONTINUED | OUTPATIENT
Start: 2024-03-27 | End: 2024-03-27 | Stop reason: SDUPTHER

## 2024-03-27 RX ORDER — LIDOCAINE HCL/PF 100 MG/5ML
SYRINGE (ML) INJECTION PRN
Status: DISCONTINUED | OUTPATIENT
Start: 2024-03-27 | End: 2024-03-27 | Stop reason: SDUPTHER

## 2024-03-27 RX ORDER — HYDROXYZINE HYDROCHLORIDE 25 MG/1
25 TABLET, FILM COATED ORAL 3 TIMES DAILY PRN
Status: DISCONTINUED | OUTPATIENT
Start: 2024-03-27 | End: 2024-03-28 | Stop reason: HOSPADM

## 2024-03-27 RX ORDER — SODIUM CHLORIDE 9 MG/ML
INJECTION, SOLUTION INTRAVENOUS PRN
Status: DISCONTINUED | OUTPATIENT
Start: 2024-03-27 | End: 2024-03-27 | Stop reason: HOSPADM

## 2024-03-27 RX ORDER — PANTOPRAZOLE SODIUM 40 MG/1
40 TABLET, DELAYED RELEASE ORAL
Status: DISCONTINUED | OUTPATIENT
Start: 2024-03-28 | End: 2024-03-28 | Stop reason: HOSPADM

## 2024-03-27 RX ADMIN — PROPOFOL 200 MG: 10 INJECTION, EMULSION INTRAVENOUS at 09:48

## 2024-03-27 RX ADMIN — ROCURONIUM BROMIDE 50 MG: 10 INJECTION INTRAVENOUS at 10:23

## 2024-03-27 RX ADMIN — OXYCODONE HYDROCHLORIDE AND ACETAMINOPHEN 2 TABLET: 5; 325 TABLET ORAL at 19:56

## 2024-03-27 RX ADMIN — CYCLOBENZAPRINE 10 MG: 10 TABLET, FILM COATED ORAL at 19:56

## 2024-03-27 RX ADMIN — ONDANSETRON 4 MG: 2 INJECTION INTRAMUSCULAR; INTRAVENOUS at 09:57

## 2024-03-27 RX ADMIN — OXYCODONE HYDROCHLORIDE AND ACETAMINOPHEN 2 TABLET: 5; 325 TABLET ORAL at 14:20

## 2024-03-27 RX ADMIN — SODIUM CHLORIDE: 9 INJECTION, SOLUTION INTRAVENOUS at 17:10

## 2024-03-27 RX ADMIN — FENTANYL CITRATE 100 MCG: 50 INJECTION, SOLUTION INTRAMUSCULAR; INTRAVENOUS at 09:48

## 2024-03-27 RX ADMIN — MIDAZOLAM 2 MG: 1 INJECTION INTRAMUSCULAR; INTRAVENOUS at 09:48

## 2024-03-27 RX ADMIN — HYDROMORPHONE HYDROCHLORIDE 0.5 MG: 1 INJECTION, SOLUTION INTRAMUSCULAR; INTRAVENOUS; SUBCUTANEOUS at 22:31

## 2024-03-27 RX ADMIN — SODIUM CHLORIDE: 9 INJECTION, SOLUTION INTRAVENOUS at 22:28

## 2024-03-27 RX ADMIN — Medication 3000 MG: at 18:50

## 2024-03-27 RX ADMIN — EPHEDRINE SULFATE 10 MG: 5 INJECTION INTRAVENOUS at 09:56

## 2024-03-27 RX ADMIN — FENTANYL CITRATE 50 MCG: 50 INJECTION INTRAMUSCULAR; INTRAVENOUS at 12:00

## 2024-03-27 RX ADMIN — CYCLOBENZAPRINE 10 MG: 10 TABLET, FILM COATED ORAL at 13:17

## 2024-03-27 RX ADMIN — ROCURONIUM BROMIDE 50 MG: 10 INJECTION INTRAVENOUS at 09:48

## 2024-03-27 RX ADMIN — DEXAMETHASONE SODIUM PHOSPHATE 8 MG: 4 INJECTION, SOLUTION INTRA-ARTICULAR; INTRALESIONAL; INTRAMUSCULAR; INTRAVENOUS; SOFT TISSUE at 17:16

## 2024-03-27 RX ADMIN — SODIUM CHLORIDE: 9 INJECTION, SOLUTION INTRAVENOUS at 07:12

## 2024-03-27 RX ADMIN — FENTANYL CITRATE 50 MCG: 50 INJECTION INTRAMUSCULAR; INTRAVENOUS at 12:30

## 2024-03-27 RX ADMIN — Medication 3000 MG: at 09:56

## 2024-03-27 RX ADMIN — FENTANYL CITRATE 50 MCG: 50 INJECTION INTRAMUSCULAR; INTRAVENOUS at 12:40

## 2024-03-27 RX ADMIN — DEXAMETHASONE SODIUM PHOSPHATE 5 MG: 10 INJECTION, EMULSION INTRAMUSCULAR; INTRAVENOUS at 09:57

## 2024-03-27 RX ADMIN — Medication 100 MG: at 09:48

## 2024-03-27 RX ADMIN — HYDROMORPHONE HYDROCHLORIDE 0.25 MG: 1 INJECTION, SOLUTION INTRAMUSCULAR; INTRAVENOUS; SUBCUTANEOUS at 08:35

## 2024-03-27 RX ADMIN — SODIUM CHLORIDE: 9 INJECTION, SOLUTION INTRAVENOUS at 09:36

## 2024-03-27 RX ADMIN — HYDROMORPHONE HYDROCHLORIDE 0.5 MG: 2 INJECTION INTRAMUSCULAR; INTRAVENOUS; SUBCUTANEOUS at 11:01

## 2024-03-27 RX ADMIN — FENTANYL CITRATE 50 MCG: 50 INJECTION INTRAMUSCULAR; INTRAVENOUS at 12:10

## 2024-03-27 RX ADMIN — HYDROMORPHONE HYDROCHLORIDE 0.25 MG: 1 INJECTION, SOLUTION INTRAMUSCULAR; INTRAVENOUS; SUBCUTANEOUS at 17:16

## 2024-03-27 RX ADMIN — MOMETASONE FUROATE AND FORMOTEROL FUMARATE DIHYDRATE 2 PUFF: 200; 5 AEROSOL RESPIRATORY (INHALATION) at 16:51

## 2024-03-27 RX ADMIN — HYDROMORPHONE HYDROCHLORIDE 0.5 MG: 2 INJECTION INTRAMUSCULAR; INTRAVENOUS; SUBCUTANEOUS at 10:32

## 2024-03-27 RX ADMIN — SUGAMMADEX 200 MG: 100 INJECTION, SOLUTION INTRAVENOUS at 11:31

## 2024-03-27 ASSESSMENT — PAIN DESCRIPTION - DESCRIPTORS
DESCRIPTORS: PRESSURE
DESCRIPTORS: PRESSURE;TIGHTNESS;SHARP
DESCRIPTORS: ACHING
DESCRIPTORS: SHARP
DESCRIPTORS: ACHING
DESCRIPTORS: ACHING
DESCRIPTORS: ACHING;TIGHTNESS;PRESSURE
DESCRIPTORS: ACHING
DESCRIPTORS: STABBING;ACHING

## 2024-03-27 ASSESSMENT — PAIN DESCRIPTION - LOCATION
LOCATION: NECK
LOCATION: NECK;BACK
LOCATION: NECK
LOCATION: BACK
LOCATION: NECK

## 2024-03-27 ASSESSMENT — PAIN SCALES - GENERAL
PAINLEVEL_OUTOF10: 6
PAINLEVEL_OUTOF10: 8
PAINLEVEL_OUTOF10: 6
PAINLEVEL_OUTOF10: 5
PAINLEVEL_OUTOF10: 6
PAINLEVEL_OUTOF10: 10
PAINLEVEL_OUTOF10: 7

## 2024-03-27 ASSESSMENT — PAIN DESCRIPTION - ORIENTATION
ORIENTATION: ANTERIOR
ORIENTATION: LEFT
ORIENTATION: LEFT
ORIENTATION: MID;LEFT
ORIENTATION: ANTERIOR
ORIENTATION: MID;LEFT
ORIENTATION: MID

## 2024-03-27 ASSESSMENT — PAIN - FUNCTIONAL ASSESSMENT
PAIN_FUNCTIONAL_ASSESSMENT: 0-10
PAIN_FUNCTIONAL_ASSESSMENT: PREVENTS OR INTERFERES SOME ACTIVE ACTIVITIES AND ADLS
PAIN_FUNCTIONAL_ASSESSMENT: 0-10
PAIN_FUNCTIONAL_ASSESSMENT: PREVENTS OR INTERFERES WITH MANY ACTIVE NOT PASSIVE ACTIVITIES
PAIN_FUNCTIONAL_ASSESSMENT: PREVENTS OR INTERFERES SOME ACTIVE ACTIVITIES AND ADLS
PAIN_FUNCTIONAL_ASSESSMENT: PREVENTS OR INTERFERES SOME ACTIVE ACTIVITIES AND ADLS
PAIN_FUNCTIONAL_ASSESSMENT: PREVENTS OR INTERFERES WITH MANY ACTIVE NOT PASSIVE ACTIVITIES

## 2024-03-27 ASSESSMENT — PAIN DESCRIPTION - PAIN TYPE
TYPE: SURGICAL PAIN

## 2024-03-27 ASSESSMENT — PAIN SCALES - WONG BAKER: WONGBAKER_NUMERICALRESPONSE: NO HURT

## 2024-03-27 NOTE — PROGRESS NOTES
Pt returned to [facility] room 2. Vitals and assessment as charted. 0.9 infusing, @600ml to count from PACU. Pt has pudding and water. Family at the bedside. Pt and family verbalized understanding of discharge criteria and call light use. Call light in reach.

## 2024-03-27 NOTE — ANESTHESIA POSTPROCEDURE EVALUATION
Department of Anesthesiology  Postprocedure Note    Patient: Angelica Jimenez  MRN: [id number]  YOB: 1975  Date of evaluation: 3/27/2024    Procedure Summary       Date: 03/27/24 Room / Location: [facility] OR  / [facility] OR    Anesthesia Start: 0936 Anesthesia Stop: 1147    Procedure: ACDF C4-C6 (Neck) Diagnosis:       Spinal stenosis in cervical region      (Spinal stenosis in cervical region [M48.02])    Surgeons: Clay Cunningham MD Responsible Provider: Ian Rangel MD    Anesthesia Type: general ASA Status: 3            Anesthesia Type: No value filed.    Narcisa Phase I: Narcisa Score: 10    Narcisa Phase II: Narcisa Score: 9    Anesthesia Post Evaluation    Patient location during evaluation: PACU  Patient participation: complete - patient participated  Level of consciousness: awake  Airway patency: patent  Nausea & Vomiting: no nausea  Cardiovascular status: hemodynamically stable  Respiratory status: acceptable  Hydration status: stable  Pain management: adequate    No notable events documented.

## 2024-03-27 NOTE — PROGRESS NOTES
Report called to Beatriz BOND on 7K. Pt to be transported to 7k14 per bed with personal belongings. No family at bedside at this time.

## 2024-03-27 NOTE — ANESTHESIA PRE PROCEDURE
Vascular:          Other Findings:       Anesthesia Plan      general     ASA 3       Induction: intravenous.    MIPS: Postoperative opioids intended and Prophylactic antiemetics administered.  Anesthetic plan and risks discussed with patient and child/children.      Plan discussed with CRNA.                JEFFY BAER DO   3/27/2024

## 2024-03-27 NOTE — PLAN OF CARE
Problem: Respiratory - Adult  Goal: Achieves optimal ventilation and oxygenation  Outcome: Progressing  Flowsheets (Taken 3/27/2024 8295)  Achieves optimal ventilation and oxygenation:   Assess for changes in respiratory status   Respiratory therapy support as indicated   Assess and instruct to report shortness of breath or any respiratory difficulty

## 2024-03-27 NOTE — H&P
[address] [address]                            PREOPERATIVE H&P      PATIENT NAME: SREEDHAR JOHNSON           : 1975  MED REC NO: [id number]                       ROOM: [facility] OR                                               (General) POOL                                               RM    ACCOUNT NO: [id number]                       ADMIT DATE: 2024  PROVIDER: Hebert Rojas PA-C      PRIMARY CARE PHYSICIAN:  Dr. Marquez    This is a patient DrBrooks Cunningham, who will be undergoing surgical intervention at [facility] on 2024, where she will undergo an ACDF of C4-C6 with allograft bone implanting.    CHIEF COMPLAINT:  Cervical pain, bilateral shoulder pain, and predominant left upper extremity radiculopathy.    HISTORY OF PRESENT ILLNESS:  The patient is a 49-year-old female, who presented to our office today with episodes of cervical pain that radiates to her bilateral shoulders and predominantly left upper extremity radiculopathy radiating to her arms, radial forearm and thumb.  She also demonstrated significant weakness of her left upper extremity, in which she describes her symptoms as pinching feeling.  She failed to improve despite her conservative treatment including physical therapy, over-the-counter NSAIDs, and activity modifications.  She was worked up with an MRI of her cervical spine, which did demonstrate significant nerve compression at C4-C5 and C5-C6.  With her failed conservative treatment, she has elected to proceed with surgical intervention and was surgically cleared by her PCP, Dr. Marquez; and cardiologist, Dr. Blanco, to undergo the surgery.    ALLERGIES:  NO KNOWN DRUG ALLERGIES.      PAST MEDICAL HISTORY:  Significant for GERD, COPD, hypertension.    CURRENT MEDICATIONS:  Include albuterol sulfate, Ventolin inhaler, atorvastatin, Symbicort, metoprolol, Abilify, ibuprofen which

## 2024-03-27 NOTE — OP NOTE
[address] [address]                            OPERATIVE REPORT      PATIENT NAME: SREEDHAR JOHNSON           : 1975  MED REC NO: [id number]                       ROOM: [facility] OR                                               (General) POOL                                               RM    ACCOUNT NO: [id number]                       ADMIT DATE: 2024  PROVIDER: Clay Cunningham MD      DATE OF PROCEDURE:  2024    SURGEON:  Clay Cunningham MD    PREOPERATIVE DIAGNOSES:    1. Cervical disk herniation at levels of C4-C5 and C5-C6.  2. Left upper radiculopathy.  3. Body mass index of 50.69 as the patient is 5 feet 8 inches in height and 151 kg in weight.    POSTOPERATIVE DIAGNOSES:    1. Cervical disk herniation at levels of C4-C5 and C5-C6.  2. Left upper extremity radiculopathy.  3. Body mass index of 50.69 as the patient is 5 feet 8 inches in height and 151 kg in weight.    PROCEDURES:    1. Anterior cervical diskectomy of levels of C4-C5 and C5-C6 with a complete uncus decompression and clearance of canal for each of the levels of surgery.  2. Anterior cervical interbody fusion at levels of C4-C5 and C5-C6.  3. Use of the mySugrpine WaveForm interbody cervical cage at each level of C4-C5 and C5-C6.  Each of these 2 cages would measure 7 x 14 x 13 mm of dimension, each of them with 7 degrees of lordosis for these 2 levels of  interbody fusion.  4. Use of allograft bone graft DBM by the Xtant FiberX 2 mL mixed with 2.5 mL of the OsteoFactor, bone growth factor mixed for the fusion and placed within each interbody cage levels of C4-C5 and C5-C6 for fusion.  5. Plating of cervical spine with a 32 mm C-spine Admiral plate attached to bone total of 6 screws each measuring 14 mm in length, attached to bone to the plate and each locking mechanism engaged.  6. Increase in the operative time over 50% more than usual because of her  could not see below this at the C5-C6.    We would finish by placing a MAURILIO drain, closing in layers and then applying an Aspen collar and then would awaken the patient to take her back to Recovery postextubation.    My first assistant has been Hebert Rojas PA-C, throughout the operation.    We planned to obtain a CT scan today because of the body habitus which obscures the view on x-ray.    Due to her body habitus and BMI of 50.69, we also found the technique and duration of this operation and also the last over 50% more than usual because of body habitus which includes all aspects of positioning, surgical retraction, technical aspects of the decompression and fusion and wound closure.          EZE VÁSQUEZ MD      D:  03/27/2024 11:34:27     T:  03/27/2024 14:29:05     KINA/SOUMYA  Job #:  [id number]     Doc#:  [id number]

## 2024-03-27 NOTE — H&P
I have reviewed the history and physical and examined the patient.  I find no relevant changes.  I have reviewed with the patient and/or family members, during the preoperative office visit the risks, benefits, and alternatives to the procedure.    Clay Cunningham MD

## 2024-03-27 NOTE — RT PROTOCOL NOTE
of breathing using Per Protocol order mode.        4-6 - enter or revise RT Bronchodilator order(s) to two equivalent RT bronchodilator orders with one order with BID Frequency and one order with Frequency of every 4 hours PRN wheezing or increased work of breathing using Per Protocol order mode.        7-10 - enter or revise RT Bronchodilator order(s) to two equivalent RT bronchodilator orders with one order with TID Frequency and one order with Frequency of every 4 hours PRN wheezing or increased work of breathing using Per Protocol order mode.       11-13 - enter or revise RT Bronchodilator order(s) to one equivalent RT bronchodilator order with QID Frequency and an Albuterol order with Frequency of every 4 hours PRN wheezing or increased work of breathing using Per Protocol order mode.      Greater than 13 - enter or revise RT Bronchodilator order(s) to one equivalent RT bronchodilator order with every 4 hours Frequency and an Albuterol order with Frequency of every 2 hours PRN wheezing or increased work of breathing using Per Protocol order mode.     RT to enter RT Home Evaluation for COPD & MDI Assessment order using Per Protocol order mode.    Electronically signed by Alexandra Tobar RCP on 3/27/2024 at 5:34 PM

## 2024-03-27 NOTE — BRIEF OP NOTE
Brief Postoperative Note      Patient: Angelica Jimenez  YOB: 1975  MRN: [id number]    Date of Procedure: 3/27/2024    Pre-Op Diagnosis Codes:     * Spinal stenosis in cervical region [M48.02]    Post-Op Diagnosis: Same       Procedure(s):  ACDF C4-C6    Surgeon(s):  Clay Cunningham MD    Assistant:  Hebert TIRADO    Anesthesia: General    Estimated Blood Loss (mL): less than 50     Complications: None    Specimens:   * No specimens in log *    Implants:  Implant Name Type Inv. Item Serial No.  Lot No. LRB No. Used Action   GRAFT BNE 2 CC DBM FIBREX - [id number]  GRAFT BNE 2 CC DBM FIBREX  [employer]  N/A 1 Implanted   CAGE SPNL 7 DEG [id number] MM 3D - [id number]  CAGE SPNL 7 DEG [id number] MM 3D  [employer] [id number] N/A 2 Implanted   GRAFT BNE MED - [id number]  GRAFT BNE MED [id number] BIOLOGICA  N/A 1 Implanted   SCREW SPNL ST 4X14 MM VA [employer]MIRAL [id number] - [id number]  SCREW SPNL ST 4X14 MM [employer] ADMIRAL [id number]  [employer]  N/A 6 Implanted         Drains: * No LDAs found *    Findings: same      Electronically signed by Clay Cunningham MD on 3/27/2024 at 11:24 AM

## 2024-03-27 NOTE — PROGRESS NOTES
.1145  pt. Awake and oriented on  arrival to pacu.  Pt. Anxious.  Pt. Upset with VISTA collar and attempting to take it off.  No deficits noted in extremities x 4.  1200  pt. Medicated for pain 10 out of 10.  1230  pt. Continues to be anxious and complains of pain 10 out of 10.  Continue to medicate for pain.  1300  pt. States she feels better.  Pt. Drinking water without difficulty.  1317  pt. Medicated for muscle relaxant.

## 2024-03-27 NOTE — PLAN OF CARE
Problem: Discharge Planning  Goal: Discharge to home or other facility with appropriate resources  3/27/2024 1947 by Cait Watson RN  Outcome: Progressing  3/27/2024 1809 by Beatriz Hackett RN  Flowsheets  Taken 3/27/2024 1809 by Beatriz Hackett RN  Discharge to home or other facility with appropriate resources:   Identify barriers to discharge with patient and caregiver   Identify discharge learning needs (meds, wound care, etc)   Arrange for needed discharge resources and transportation as appropriate  Taken 3/27/2024 1430 by Dona Luong RN  Discharge to home or other facility with appropriate resources: Identify discharge learning needs (meds, wound care, etc)     Problem: Pain  Goal: Verbalizes/displays adequate comfort level or baseline comfort level  3/27/2024 1947 by Cait Watson RN  Outcome: Progressing  3/27/2024 1809 by Beatriz Hackett RN  Outcome: Progressing  Flowsheets (Taken 3/27/2024 1809)  Verbalizes/displays adequate comfort level or baseline comfort level:   Encourage patient to monitor pain and request assistance   Administer analgesics based on type and severity of pain and evaluate response   Implement non-pharmacological measures as appropriate and evaluate response     Problem: Respiratory - Adult  Goal: Achieves optimal ventilation and oxygenation  3/27/2024 1947 by Cait Watson RN  Outcome: Progressing  3/27/2024 1654 by Alexandra Tobar RCP  Outcome: Progressing  Flowsheets (Taken 3/27/2024 1654)  Achieves optimal ventilation and oxygenation:   Assess for changes in respiratory status   Respiratory therapy support as indicated   Assess and instruct to report shortness of breath or any respiratory difficulty

## 2024-03-27 NOTE — PROGRESS NOTES
Pt admitted to [facility] room 2 and oriented to unit. SCD sleeves applied. Nares swabbed. Pt verbalized permission for first name, last initial and physicians name on white board. SDS board and discharge criteria explained, pt and family verbalized understanding. Pt denies thoughts of harming self or others. Call light in reach. Family at the bedside.  Shalonda [phone]

## 2024-03-27 NOTE — PLAN OF CARE
Problem: Pain  Goal: Verbalizes/displays adequate comfort level or baseline comfort level  Outcome: Progressing  Flowsheets (Taken 3/27/2024 1809)  Verbalizes/displays adequate comfort level or baseline comfort level:   Encourage patient to monitor pain and request assistance   Administer analgesics based on type and severity of pain and evaluate response   Implement non-pharmacological measures as appropriate and evaluate response     Problem: Respiratory - Adult  Goal: Achieves optimal ventilation and oxygenation  3/27/2024 1654 by Alexandra Tobar RCP  Outcome: Progressing  Flowsheets (Taken 3/27/2024 1654)  Achieves optimal ventilation and oxygenation:   Assess for changes in respiratory status   Respiratory therapy support as indicated   Assess and instruct to report shortness of breath or any respiratory difficulty     Problem: Discharge Planning  Goal: Discharge to home or other facility with appropriate resources  Flowsheets  Taken 3/27/2024 1809 by Beatriz Hackett, RN  Discharge to home or other facility with appropriate resources:   Identify barriers to discharge with patient and caregiver   Identify discharge learning needs (meds, wound care, etc)   Arrange for needed discharge resources and transportation as appropriate  Taken 3/27/2024 1430 by Dona Luong, RN  Discharge to home or other facility with appropriate resources: Identify discharge learning needs (meds, wound care, etc)

## 2024-03-28 VITALS
RESPIRATION RATE: 18 BRPM | TEMPERATURE: 97.7 F | OXYGEN SATURATION: 93 % | SYSTOLIC BLOOD PRESSURE: 132 MMHG | HEIGHT: 68 IN | DIASTOLIC BLOOD PRESSURE: 63 MMHG | BODY MASS INDEX: 44.41 KG/M2 | HEART RATE: 79 BPM | WEIGHT: 293 LBS

## 2024-03-28 LAB
BASOPHILS ABSOLUTE: 0 THOU/MM3 (ref 0–0.1)
BASOPHILS NFR BLD AUTO: 0.1 %
DEPRECATED RDW RBC AUTO: 45.9 FL (ref 35–45)
EOSINOPHIL NFR BLD AUTO: 0 %
EOSINOPHILS ABSOLUTE: 0 THOU/MM3 (ref 0–0.4)
ERYTHROCYTE [DISTWIDTH] IN BLOOD BY AUTOMATED COUNT: 12.8 % (ref 11.5–14.5)
HCT VFR BLD AUTO: 36.9 % (ref 37–47)
HGB BLD-MCNC: 12.3 GM/DL (ref 12–16)
IMM GRANULOCYTES # BLD AUTO: 0.05 THOU/MM3 (ref 0–0.07)
IMM GRANULOCYTES NFR BLD AUTO: 0.5 %
LYMPHOCYTES ABSOLUTE: 1.3 THOU/MM3 (ref 1–4.8)
LYMPHOCYTES NFR BLD AUTO: 12.6 %
MCH RBC QN AUTO: 33 PG (ref 26–33)
MCHC RBC AUTO-ENTMCNC: 33.3 GM/DL (ref 32.2–35.5)
MCV RBC AUTO: 98.9 FL (ref 81–99)
MONOCYTES ABSOLUTE: 0.3 THOU/MM3 (ref 0.4–1.3)
MONOCYTES NFR BLD AUTO: 2.7 %
NEUTROPHILS NFR BLD AUTO: 84.1 %
NRBC BLD AUTO-RTO: 0 /100 WBC
PLATELET # BLD AUTO: 302 THOU/MM3 (ref 130–400)
PMV BLD AUTO: 9.5 FL (ref 9.4–12.4)
RBC # BLD AUTO: 3.73 MILL/MM3 (ref 4.2–5.4)
SEGMENTED NEUTROPHILS ABSOLUTE COUNT: 8.6 THOU/MM3 (ref 1.8–7.7)
WBC # BLD AUTO: 10.2 THOU/MM3 (ref 4.8–10.8)

## 2024-03-28 PROCEDURE — 97530 THERAPEUTIC ACTIVITIES: CPT

## 2024-03-28 PROCEDURE — 6370000000 HC RX 637 (ALT 250 FOR IP): Performed by: PHYSICIAN ASSISTANT

## 2024-03-28 PROCEDURE — 36415 COLL VENOUS BLD VENIPUNCTURE: CPT

## 2024-03-28 PROCEDURE — 97166 OT EVAL MOD COMPLEX 45 MIN: CPT

## 2024-03-28 PROCEDURE — 97535 SELF CARE MNGMENT TRAINING: CPT

## 2024-03-28 PROCEDURE — 2580000003 HC RX 258: Performed by: PHYSICIAN ASSISTANT

## 2024-03-28 PROCEDURE — 94640 AIRWAY INHALATION TREATMENT: CPT

## 2024-03-28 PROCEDURE — 6360000002 HC RX W HCPCS: Performed by: PHYSICIAN ASSISTANT

## 2024-03-28 PROCEDURE — 85025 COMPLETE CBC W/AUTO DIFF WBC: CPT

## 2024-03-28 RX ORDER — OXYCODONE HYDROCHLORIDE AND ACETAMINOPHEN 5; 325 MG/1; MG/1
1 TABLET ORAL EVERY 4 HOURS PRN
Qty: 42 TABLET | Refills: 0 | Status: SHIPPED | OUTPATIENT
Start: 2024-03-28 | End: 2024-04-04

## 2024-03-28 RX ORDER — CYCLOBENZAPRINE HCL 10 MG
10 TABLET ORAL 3 TIMES DAILY PRN
Qty: 50 TABLET | Refills: 0 | Status: SHIPPED | OUTPATIENT
Start: 2024-03-28 | End: 2024-04-07

## 2024-03-28 RX ADMIN — CYCLOBENZAPRINE 10 MG: 10 TABLET, FILM COATED ORAL at 05:15

## 2024-03-28 RX ADMIN — OXYCODONE HYDROCHLORIDE AND ACETAMINOPHEN 2 TABLET: 5; 325 TABLET ORAL at 07:50

## 2024-03-28 RX ADMIN — ONDANSETRON 4 MG: 2 INJECTION INTRAMUSCULAR; INTRAVENOUS at 02:11

## 2024-03-28 RX ADMIN — DEXAMETHASONE SODIUM PHOSPHATE 8 MG: 4 INJECTION, SOLUTION INTRA-ARTICULAR; INTRALESIONAL; INTRAMUSCULAR; INTRAVENOUS; SOFT TISSUE at 09:05

## 2024-03-28 RX ADMIN — DULOXETINE HYDROCHLORIDE 60 MG: 60 CAPSULE, DELAYED RELEASE ORAL at 09:05

## 2024-03-28 RX ADMIN — Medication 3000 MG: at 01:23

## 2024-03-28 RX ADMIN — OXYCODONE HYDROCHLORIDE AND ACETAMINOPHEN 2 TABLET: 5; 325 TABLET ORAL at 03:51

## 2024-03-28 RX ADMIN — HYDROMORPHONE HYDROCHLORIDE 0.5 MG: 1 INJECTION, SOLUTION INTRAMUSCULAR; INTRAVENOUS; SUBCUTANEOUS at 09:07

## 2024-03-28 RX ADMIN — POLYETHYLENE GLYCOL 3350 17 G: 17 POWDER, FOR SOLUTION ORAL at 09:09

## 2024-03-28 RX ADMIN — HYDROMORPHONE HYDROCHLORIDE 0.5 MG: 1 INJECTION, SOLUTION INTRAMUSCULAR; INTRAVENOUS; SUBCUTANEOUS at 05:15

## 2024-03-28 RX ADMIN — BENZOCAINE 6 MG-MENTHOL 10 MG LOZENGES 1 LOZENGE: at 02:16

## 2024-03-28 RX ADMIN — HYDROMORPHONE HYDROCHLORIDE 0.5 MG: 1 INJECTION, SOLUTION INTRAMUSCULAR; INTRAVENOUS; SUBCUTANEOUS at 02:09

## 2024-03-28 RX ADMIN — ARIPIPRAZOLE 10 MG: 10 TABLET ORAL at 09:05

## 2024-03-28 RX ADMIN — BISACODYL 5 MG: 5 TABLET, COATED ORAL at 09:05

## 2024-03-28 RX ADMIN — ATORVASTATIN CALCIUM 40 MG: 40 TABLET, FILM COATED ORAL at 09:05

## 2024-03-28 RX ADMIN — PANTOPRAZOLE SODIUM 40 MG: 40 TABLET, DELAYED RELEASE ORAL at 05:15

## 2024-03-28 RX ADMIN — DEXAMETHASONE SODIUM PHOSPHATE 8 MG: 4 INJECTION, SOLUTION INTRA-ARTICULAR; INTRALESIONAL; INTRAMUSCULAR; INTRAVENOUS; SOFT TISSUE at 01:18

## 2024-03-28 RX ADMIN — MOMETASONE FUROATE AND FORMOTEROL FUMARATE DIHYDRATE 2 PUFF: 200; 5 AEROSOL RESPIRATORY (INHALATION) at 07:36

## 2024-03-28 ASSESSMENT — PAIN SCALES - GENERAL
PAINLEVEL_OUTOF10: 7
PAINLEVEL_OUTOF10: 4
PAINLEVEL_OUTOF10: 7
PAINLEVEL_OUTOF10: 4
PAINLEVEL_OUTOF10: 6
PAINLEVEL_OUTOF10: 8
PAINLEVEL_OUTOF10: 5
PAINLEVEL_OUTOF10: 4
PAINLEVEL_OUTOF10: 8
PAINLEVEL_OUTOF10: 7
PAINLEVEL_OUTOF10: 3
PAINLEVEL_OUTOF10: 5

## 2024-03-28 ASSESSMENT — PAIN DESCRIPTION - ORIENTATION
ORIENTATION: ANTERIOR
ORIENTATION: MID
ORIENTATION: ANTERIOR
ORIENTATION: LEFT;RIGHT

## 2024-03-28 ASSESSMENT — PAIN DESCRIPTION - DESCRIPTORS
DESCRIPTORS: ACHING
DESCRIPTORS: ACHING
DESCRIPTORS: ACHING;THROBBING

## 2024-03-28 ASSESSMENT — PAIN DESCRIPTION - LOCATION
LOCATION: NECK
LOCATION: NECK;BACK
LOCATION: NECK;SHOULDER

## 2024-03-28 ASSESSMENT — PAIN SCALES - WONG BAKER
WONGBAKER_NUMERICALRESPONSE: NO HURT

## 2024-03-28 ASSESSMENT — PAIN - FUNCTIONAL ASSESSMENT
PAIN_FUNCTIONAL_ASSESSMENT: ACTIVITIES ARE NOT PREVENTED

## 2024-03-28 NOTE — PLAN OF CARE
Problem: Respiratory - Adult  Goal: Achieves optimal ventilation and oxygenation  3/28/2024 0739 by Candi Santizo, RCP  Outcome: Progressing

## 2024-03-28 NOTE — PROGRESS NOTES
AVS reviewed with patient. No concerns voiced. Patient is aware of her medications being called in to her pharmacy and appointments. Patient insisted on walking with this nurse to main lobby. Patients daughter transporting patient home.

## 2024-03-28 NOTE — PLAN OF CARE
Problem: Discharge Planning  Goal: Discharge to home or other facility with appropriate resources  Outcome: Completed     Problem: Pain  Goal: Verbalizes/displays adequate comfort level or baseline comfort level  Outcome: Completed     Problem: Respiratory - Adult  Goal: Achieves optimal ventilation and oxygenation  3/28/2024 1105 by Nina Wynne LPN  Outcome: Completed     Problem: ABCDS Injury Assessment  Goal: Absence of physical injury  Outcome: Completed     Problem: Safety - Adult  Goal: Free from fall injury  Outcome: Completed   Care plan reviewed with patient.  Patient verbalized understanding of the plan of care and contribute to goal setting.

## 2024-03-28 NOTE — CARE COORDINATION
Alert and oriented x4 at the beginning of the shift. Approximately midnight patient attempted to exit bed without assistance. Alarm sounded, this nurse and PCT assisted patient to BSC. Patient stated, \" I don't know where I am\".  Writer reoriented at this time.

## 2024-03-28 NOTE — DISCHARGE SUMMARY
[address] [place] [address]                            DISCHARGE SUMMARY      PATIENT NAME: SREEDHAR JOHNSON           : 1975  MED REC NO: [id number]                       ROOM: [facility]  ACCOUNT NO: [id number]                       ADMIT DATE: 2024  PROVIDER: GONZÁLEZ Talley      DISCHARGE DIAGNOSIS:  Cervical disk herniation, C4-5 and C5-6.    OPERATION PERFORMED:  Anterior cervical discectomy and fusion C4-C6 with allograft bone and plating with Dr. Cunningham.    HOSPITAL COURSE:  The patient is a 49-year-old female who presented to our office with complaints of significant symptoms of neck and arm pain, worse in the left upper extremity with loss of strength and numbness in the upper extremity as well.  She had failed to improve despite conservative management and elected to undergo operative management.  She underwent surgery at [facility] on the date of 2024 and postop was admitted to floor 7 for observation.  She was seen on postop day 1, doing overall well.  She had good improvement of her preoperative radicular pain.  She was tolerating swallowing well, but did have some significant dysphagia as expected.  Pain was controlled with medications.  She was fully neurologically intact and ready to discharge home on postop day 1.  Her MAURILIO drain was left in place, dressing intact and an order for home health was placed.    DISCHARGE MEDICATIONS:  Include Percocet and Flexeril.    DISCHARGE INSTRUCTIONS:  Include continued use of the cervical collar for 2 weeks.  No driving for 2 weeks.  No heavy lifting or bending or twisting of the cervical spine.    We will see her back in the office in 2 weeks' time for re-evaluation with x-rays.    DISCHARGE DISPOSITION:  Home with home health.    Discharge condition is stable.          GONZÁLEZ TALLEY    D:  2024 07:36:08     T:  2024 08:49:25     AC/SOUMYA  Job #:

## 2024-03-28 NOTE — PROGRESS NOTES
Department of Orthopedic Surgery  Spine Service  Chris Olivo PA-C Progress Note        Subjective:    3/28/24  Angelica is resting in bed. Doing well. Improved UE radicular pain. Swallowing well. Ready to discharge home today.     Vitals  VITALS:  /77   Pulse 76   Temp 97.7 °F (36.5 °C) (Oral)   Resp 16   Ht 1.727 m (5' 8\")   Wt (!) 151.2 kg (333 lb 6.4 oz)   SpO2 96%   BMI 50.69 kg/m²   24HR INTAKE/OUTPUT:    Intake/Output Summary (Last 24 hours) at 3/28/2024 0726  Last data filed at 3/28/2024 0400  Gross per 24 hour   Intake 700 ml   Output 55 ml   Net 645 ml     URINARY CATHETER OUTPUT (Quiros):     DRAIN/TUBE OUTPUT:  Closed/Suction Drain Anterior Neck-Output (ml): 20 ml      PHYSICAL EXAM:    Orientation:  alert and oriented to person, place and time    Incision:  dressing in place, clean, dry, intact    Upper Extremity Motor :   Deltoid:  5/5  Biceps:  5/5  Triceps:  5/5  Wrist Flexion:  5/5  Wrist Extension:  5/5  Finger Flexion:  5/5  Finger Extension:  5/5    Upper Motor Neuron Signs:  None  Upper Extremity Sensory:  Intact C3-T1 distribution    Flatus:  positive    ABNORMAL EXAM FINDINGS:  none    LABS:    HgB:    Lab Results   Component Value Date/Time    HGB 12.3 03/28/2024 04:21 AM         ASSESSMENT AND PLAN:    Post operative day 1     1:  Monitor labs and drain output  2:  Activity Level:  OOB as tolerated  3:  Pain Control:  Controlled  4:  Discharge Planning:  Home with

## 2024-03-28 NOTE — CARE COORDINATION
3/28/24, 8:03 AM EDT      DISCHARGE PLANNING EVALUATION    Angelica Jimenez  Admitted: 3/27/2024  Hospital Day: 0    Location: -14/014-A Reason for admit: Spinal stenosis in cervical region [M48.02]  Spinal stenosis of cervical region with radiculopathy [M48.02, M54.12]    Past Medical History:   Diagnosis Date    Asthma     Bulging of cervical intervertebral disc     c3-c6    Hx of migraines      Planned cervical surgery with Dr Cunningham, pt from [facility], has [facility] insurance.  Procedure: 3/27  ACDF C4-C6   Barriers to Discharge: POD 1, needs HH set up before discharge.    PCP: Za Vega MD  ([place])    Readmission Risk Low 0-14, Mod 15-19), High > 20: No data recorded    Advance Directives:      Code Status: Full Code   Patient's Primary Decision Maker is: Legal Next of Kin daughter/confirmed      Patient Goals/Plan/Treatment Preferences: Spoke with Angelica and her daughter; she resides home with her in a trailer in [facility]. She has insurance, PCP updated, uses no DME, and is ready for discharge. She declined HH list and chose  HH if they can take her insurance.     10:59 AM  Referral called to Charisse at  HH; pt accepted. Plans discharge this morning.          Transportation/Food Security/Housekeeping Addressed: No issues identified.     If patient is discharged prior to next notation, then this note serves as note for discharge by case management.

## 2024-03-28 NOTE — PROGRESS NOTES
[facility]  INPATIENT OCCUPATIONAL THERAPY  [facility] ORTHOPEDICS 7K  EVALUATION    Time:   Time In: 0900  Time Out: 0932  Timed Code Treatment Minutes: 24 Minutes  Minutes: 32          Date: 3/28/2024  Patient Name: Angelica Jimenez,   Gender: female      MRN: [id number]  : 1975  (49 y.o.)  Referring Practitioner: Hebert Rojas PA-C  Diagnosis: Spinal Stenosis in cervical region  Additional Pertinent Hx: This 49 year old female is s/p ACDF C4-C6 on 3/27/24.    Restrictions/Precautions:  Restrictions/Precautions: General Precautions  Required Braces or Orthoses  Cervical: c-collar  Position Activity Restriction  Spinal Precautions: No Bending, No Lifting, No Twisting (Cervical Spine)    Subjective  Chart Reviewed: Yes, Orders, Progress Notes, History and Physical, Operative Notes  Patient assessed for rehabilitation services?: Yes    Subjective: RN okayed OT session. Upon arrival patient was sitting up in bed. Pt was agreeable to OT session.    Pain: 7/10: Neck     Vitals: Vitals not assessed per clinical judgement, see nursing flowsheet    Social/Functional History:  Lives With: Daughter  Type of Home: Mobile home  Home Layout: One level  Home Access: Stairs to enter with rails  Entrance Stairs - Number of Steps: 4 RADHA   Bathroom Shower/Tub: Tub/Shower unit  Bathroom Toilet: Standard  Bathroom Accessibility: Accessible    Receives Help From: Family  ADL Assistance: Independent  Homemaking Assistance: Independent  Homemaking Responsibilities: Yes  Ambulation Assistance: Independent  Transfer Assistance: Independent    Active : Yes  Occupation: Full time employment  Type of Occupation:        VISION:WFL    HEARING:  WFL    COGNITION: WFL    RANGE OF MOTION:  Bilateral Upper Extremity:  WFL    STRENGTH:  Bilateral Upper Extremity:  Not Tested    SENSATION:   WFL    ADL:   Upper Extremity Dressing: Stand By Assistance, with verbal cues , and with increased time for completion.  To  understanding;Verbalized understanding    Equipment Recommendations:  Equipment Needed: No    Plan:  Times Per Week: N/A.  See long-term goal time frame for expected duration of plan of care.  If no long-term goals established, a short length of stay is anticipated.    Goals:  Patient goals : Go Home  Short Term Goals  Time Frame for Short Term Goals: N/A-Pt is safe to d/c home    AM-PAC Inpatient Daily Activity Raw Score: 24  AM-PAC Inpatient ADL T-Scale Score : 57.54    Following session, patient left in safe position with all fall risk precautions in place.

## (undated) DEVICE — GLOVE SURG SZ 65 THK91MIL LTX FREE SYN POLYISOPRENE

## (undated) DEVICE — BUR CUT RND FLUT AGRSV 4.0MM

## (undated) DEVICE — DRAIN SURG FLAT W7MMXL20CM FULL PERF

## (undated) DEVICE — EVACUATOR SURG 100CC SIL BLB SUCT RESVR FOR CLS WND DRNGE

## (undated) DEVICE — 3M™ TEGADERM™ CHG CHLORHEXIDINE GLUCONATE I.V. PORT DRESSING STERILE U.S. ONLY 25/CARTON 4 CARTONS/CASE 1665: Brand: TEGADERM™

## (undated) DEVICE — APPLICATOR MEDICATED 10.5 CC SOLUTION HI LT ORNG CHLORAPREP

## (undated) DEVICE — Device

## (undated) DEVICE — PAD OP RM W20XL72XH2IN PRECIS CUT FLAT EC BIOCLINIC

## (undated) DEVICE — DRAPE MICSCP W132XL406CM LENS DIA68MM W VARI LENS2 FOR LEICA

## (undated) DEVICE — COLLAR CERV UNIV AD L13-19IN TRACH OPN TWO PC RIG POLYETH

## (undated) DEVICE — MASTISOL ADHESIVE LIQ 2/3ML

## (undated) DEVICE — Z DISC USE 2218423 SUTURE ETHLN SZ 3-0 L18IN NONABSORBABLE BLK FS-1 L24MM 3/8 663H

## (undated) DEVICE — ADMIRAL ACP DRILL, 12MM: Brand: ADMIRAL

## (undated) DEVICE — STRIP,CLOSURE,WOUND,MEDI-STRIP,1/2X4: Brand: MEDLINE

## (undated) DEVICE — CODMAN® SURGICAL PATTIES 1/2" X 1/2" (1.27CM X 1.27CM): Brand: CODMAN®

## (undated) DEVICE — PACK-MAJOR

## (undated) DEVICE — AGENT HEMOSTATIC SURGIFLOW MATRIX KIT W/THROMBIN

## (undated) DEVICE — GOWN,SIRUS,NON REINFRCD,LARGE,SET IN SL: Brand: MEDLINE

## (undated) DEVICE — Device: Brand: FABCO

## (undated) DEVICE — GLOVE SURG SZ 9 THK91MIL LTX FREE SYN POLYISOPRENE ANTI

## (undated) DEVICE — SUTURE MCRYL SZ 4-0 L27IN ABSRB UD L19MM PS-2 1/2 CIR PRIM Y426H

## (undated) DEVICE — Z DISC USE 2752451 SUTURE VCRL SZ 3-0 L27IN ABSRB UD FS-2 L19MM 1/2 CIR J423H